# Patient Record
Sex: FEMALE | Race: WHITE | NOT HISPANIC OR LATINO | Employment: STUDENT | URBAN - METROPOLITAN AREA
[De-identification: names, ages, dates, MRNs, and addresses within clinical notes are randomized per-mention and may not be internally consistent; named-entity substitution may affect disease eponyms.]

---
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
## 2022-09-21 ENCOUNTER — OFFICE VISIT (OUTPATIENT)
Dept: FAMILY MEDICINE | Facility: CLINIC | Age: 22
End: 2022-09-21
Payer: COMMERCIAL

## 2022-09-21 VITALS
SYSTOLIC BLOOD PRESSURE: 128 MMHG | OXYGEN SATURATION: 99 % | HEIGHT: 63 IN | BODY MASS INDEX: 24.42 KG/M2 | RESPIRATION RATE: 20 BRPM | DIASTOLIC BLOOD PRESSURE: 78 MMHG | HEART RATE: 99 BPM | TEMPERATURE: 98 F | WEIGHT: 137.81 LBS

## 2022-09-21 DIAGNOSIS — Z11.4 ENCOUNTER FOR SCREENING FOR HIV: ICD-10-CM

## 2022-09-21 DIAGNOSIS — Z00.00 LABORATORY EXAM ORDERED AS PART OF ROUTINE GENERAL MEDICAL EXAMINATION: ICD-10-CM

## 2022-09-21 DIAGNOSIS — Z11.3 SCREENING EXAMINATION FOR STD (SEXUALLY TRANSMITTED DISEASE): ICD-10-CM

## 2022-09-21 DIAGNOSIS — F41.9 ANXIETY: ICD-10-CM

## 2022-09-21 DIAGNOSIS — Z00.00 ROUTINE PHYSICAL EXAMINATION: Primary | ICD-10-CM

## 2022-09-21 DIAGNOSIS — Z11.59 NEED FOR HEPATITIS C SCREENING TEST: ICD-10-CM

## 2022-09-21 PROCEDURE — 3074F PR MOST RECENT SYSTOLIC BLOOD PRESSURE < 130 MM HG: ICD-10-PCS | Mod: CPTII,S$GLB,, | Performed by: NURSE PRACTITIONER

## 2022-09-21 PROCEDURE — 3074F SYST BP LT 130 MM HG: CPT | Mod: CPTII,S$GLB,, | Performed by: NURSE PRACTITIONER

## 2022-09-21 PROCEDURE — 3008F BODY MASS INDEX DOCD: CPT | Mod: CPTII,S$GLB,, | Performed by: NURSE PRACTITIONER

## 2022-09-21 PROCEDURE — 3078F DIAST BP <80 MM HG: CPT | Mod: CPTII,S$GLB,, | Performed by: NURSE PRACTITIONER

## 2022-09-21 PROCEDURE — 99385 PREV VISIT NEW AGE 18-39: CPT | Mod: S$GLB,,, | Performed by: NURSE PRACTITIONER

## 2022-09-21 PROCEDURE — 3008F PR BODY MASS INDEX (BMI) DOCUMENTED: ICD-10-PCS | Mod: CPTII,S$GLB,, | Performed by: NURSE PRACTITIONER

## 2022-09-21 PROCEDURE — 3078F PR MOST RECENT DIASTOLIC BLOOD PRESSURE < 80 MM HG: ICD-10-PCS | Mod: CPTII,S$GLB,, | Performed by: NURSE PRACTITIONER

## 2022-09-21 PROCEDURE — 1159F MED LIST DOCD IN RCRD: CPT | Mod: CPTII,S$GLB,, | Performed by: NURSE PRACTITIONER

## 2022-09-21 PROCEDURE — 99385 PR PREVENTIVE VISIT,NEW,18-39: ICD-10-PCS | Mod: S$GLB,,, | Performed by: NURSE PRACTITIONER

## 2022-09-21 PROCEDURE — 1159F PR MEDICATION LIST DOCUMENTED IN MEDICAL RECORD: ICD-10-PCS | Mod: CPTII,S$GLB,, | Performed by: NURSE PRACTITIONER

## 2022-09-21 RX ORDER — HYDROXYZINE HYDROCHLORIDE 10 MG/1
10 TABLET, FILM COATED ORAL 3 TIMES DAILY PRN
Qty: 40 TABLET | Refills: 1 | Status: SHIPPED | OUTPATIENT
Start: 2022-09-21

## 2022-09-21 RX ORDER — ESCITALOPRAM OXALATE 10 MG/1
10 TABLET ORAL DAILY
Qty: 90 TABLET | Refills: 3 | Status: SHIPPED | OUTPATIENT
Start: 2022-09-21 | End: 2023-06-27 | Stop reason: SDUPTHER

## 2022-09-21 RX ORDER — VALACYCLOVIR HYDROCHLORIDE 1 G/1
TABLET, FILM COATED ORAL
COMMUNITY
Start: 2022-04-27 | End: 2022-09-30 | Stop reason: SDUPTHER

## 2022-09-21 NOTE — PROGRESS NOTES
Subjective:       Patient ID: Arthur Ibarra is a 21 y.o. female.    Chief Complaint: Depression and Anxiety  The patient is here for routine physical/establish care/discuss anxiety.    DepressionPatient presents with the following symptoms: decreased concentration, depressed mood, excessive worry, insomnia, irritability, muscle tension, nervousness/anxiety, obsessions, palpitations, panic and restlessness.  Patient is not experiencing: choking sensation and shortness of breath.      Anxiety  Presents for initial visit. Symptoms include decreased concentration, depressed mood, dizziness, excessive worry, insomnia, irritability, muscle tension, nervous/anxious behavior, obsessions, palpitations, panic and restlessness. Patient reports no nausea or shortness of breath.     NO SI/HI.   Review of Systems   Constitutional:  Positive for irritability. Negative for activity change, appetite change, chills and fever.   HENT:  Negative for congestion, ear pain, postnasal drip, rhinorrhea and sinus pressure.    Eyes:  Negative for pain, redness and itching.   Respiratory:  Negative for choking, chest tightness and shortness of breath.    Cardiovascular:  Positive for palpitations.   Gastrointestinal:  Negative for abdominal distention, abdominal pain, blood in stool, constipation, diarrhea, nausea and vomiting.   Endocrine: Negative for polydipsia, polyphagia and polyuria.   Genitourinary:  Negative for difficulty urinating, dysuria, flank pain and hematuria.   Musculoskeletal:  Negative for arthralgias and myalgias.   Skin:  Negative for color change, pallor and rash.   Neurological:  Positive for dizziness. Negative for light-headedness and headaches.   Hematological:  Negative for adenopathy. Does not bruise/bleed easily.   Psychiatric/Behavioral:  Positive for decreased concentration and depression. Negative for agitation and behavioral problems. The patient is nervous/anxious and has insomnia.      Past medical, surgical,  "family and social history reviewed.  Objective:     Vitals:    09/21/22 1453   BP: 128/78   Pulse: 99   Resp: 20   Temp: 98.3 °F (36.8 °C)   SpO2: 99%   Weight: 62.5 kg (137 lb 12.6 oz)   Height: 5' 3" (1.6 m)   PainSc: 0-No pain     Body mass index is 24.41 kg/m².     Physical Exam  Constitutional:       Appearance: She is well-developed.   HENT:      Head: Normocephalic and atraumatic.      Right Ear: External ear normal.      Left Ear: External ear normal.      Nose: Nose normal.   Eyes:      General: No scleral icterus.        Right eye: No discharge.         Left eye: No discharge.      Conjunctiva/sclera: Conjunctivae normal.   Neck:      Trachea: No tracheal deviation.   Cardiovascular:      Rate and Rhythm: Normal rate and regular rhythm.      Heart sounds: Normal heart sounds. No murmur heard.    No friction rub.   Pulmonary:      Effort: Pulmonary effort is normal. No respiratory distress.      Breath sounds: Normal breath sounds. No stridor. No wheezing or rales.   Chest:      Chest wall: No tenderness.   Musculoskeletal:         General: Normal range of motion.      Cervical back: Normal range of motion and neck supple.   Lymphadenopathy:      Cervical: No cervical adenopathy.   Skin:     General: Skin is warm and dry.   Neurological:      Mental Status: She is alert and oriented to person, place, and time.       Assessment:       1. Routine physical examination    2. Laboratory exam ordered as part of routine general medical examination    3. Anxiety    4. Need for hepatitis C screening test    5. Encounter for screening for HIV    6. Screening examination for STD (sexually transmitted disease)        Plan:       Arthur was seen today for depression and anxiety.    Diagnoses and all orders for this visit:    Routine physical examination    Laboratory exam ordered as part of routine general medical examination  -     CBC Auto Differential; Future  -     Comprehensive Metabolic Panel; Future  -     " Hemoglobin A1C; Future  -     Lipid Panel; Future  -     TSH; Future    Anxiety  -     Ambulatory referral/consult to Psychology; Future    Need for hepatitis C screening test  -     Hepatitis C Antibody; Future    Encounter for screening for HIV  -     HIV 1/2 Ag/Ab (4th Gen); Future    Screening examination for STD (sexually transmitted disease)  -     C. trachomatis/N. gonorrhoeae by AMP DNA [facility]; Urine    Other orders  -     EScitalopram oxalate (LEXAPRO) 10 MG tablet; Take 1 tablet (10 mg total) by mouth once daily.  -     hydrOXYzine HCL (ATARAX) 10 MG Tab; Take 1 tablet (10 mg total) by mouth 3 (three) times daily as needed (anxiety).

## 2022-09-29 ENCOUNTER — LAB VISIT (OUTPATIENT)
Dept: LAB | Facility: HOSPITAL | Age: 22
End: 2022-09-29
Payer: COMMERCIAL

## 2022-09-29 DIAGNOSIS — Z11.59 NEED FOR HEPATITIS C SCREENING TEST: ICD-10-CM

## 2022-09-29 DIAGNOSIS — Z11.4 ENCOUNTER FOR SCREENING FOR HIV: ICD-10-CM

## 2022-09-29 DIAGNOSIS — Z00.00 LABORATORY EXAM ORDERED AS PART OF ROUTINE GENERAL MEDICAL EXAMINATION: ICD-10-CM

## 2022-09-29 LAB
ALBUMIN SERPL BCP-MCNC: 4.6 G/DL (ref 3.5–5.2)
ALP SERPL-CCNC: 93 U/L (ref 55–135)
ALT SERPL W/O P-5'-P-CCNC: 13 U/L (ref 10–44)
ANION GAP SERPL CALC-SCNC: 6 MMOL/L (ref 8–16)
AST SERPL-CCNC: 20 U/L (ref 10–40)
BASOPHILS # BLD AUTO: 0.05 K/UL (ref 0–0.2)
BASOPHILS NFR BLD: 0.6 % (ref 0–1.9)
BILIRUB SERPL-MCNC: 1.5 MG/DL (ref 0.1–1)
BUN SERPL-MCNC: 10 MG/DL (ref 6–20)
CALCIUM SERPL-MCNC: 10.4 MG/DL (ref 8.7–10.5)
CHLORIDE SERPL-SCNC: 103 MMOL/L (ref 95–110)
CHOLEST SERPL-MCNC: 147 MG/DL (ref 120–199)
CHOLEST/HDLC SERPL: 2.2 {RATIO} (ref 2–5)
CO2 SERPL-SCNC: 26 MMOL/L (ref 23–29)
CREAT SERPL-MCNC: 0.8 MG/DL (ref 0.5–1.4)
DIFFERENTIAL METHOD: ABNORMAL
EOSINOPHIL # BLD AUTO: 0.1 K/UL (ref 0–0.5)
EOSINOPHIL NFR BLD: 1.2 % (ref 0–8)
ERYTHROCYTE [DISTWIDTH] IN BLOOD BY AUTOMATED COUNT: 13.4 % (ref 11.5–14.5)
EST. GFR  (NO RACE VARIABLE): >60 ML/MIN/1.73 M^2
ESTIMATED AVG GLUCOSE: 94 MG/DL (ref 68–131)
GLUCOSE SERPL-MCNC: 85 MG/DL (ref 70–110)
HBA1C MFR BLD: 4.9 % (ref 4–5.6)
HCT VFR BLD AUTO: 44.4 % (ref 37–48.5)
HCV AB SERPL QL IA: NORMAL
HDLC SERPL-MCNC: 68 MG/DL (ref 40–75)
HDLC SERPL: 46.3 % (ref 20–50)
HGB BLD-MCNC: 14 G/DL (ref 12–16)
HIV 1+2 AB+HIV1 P24 AG SERPL QL IA: NORMAL
IMM GRANULOCYTES # BLD AUTO: 0.04 K/UL (ref 0–0.04)
IMM GRANULOCYTES NFR BLD AUTO: 0.5 % (ref 0–0.5)
LDLC SERPL CALC-MCNC: 68.6 MG/DL (ref 63–159)
LYMPHOCYTES # BLD AUTO: 2.4 K/UL (ref 1–4.8)
LYMPHOCYTES NFR BLD: 28.3 % (ref 18–48)
MCH RBC QN AUTO: 27.1 PG (ref 27–31)
MCHC RBC AUTO-ENTMCNC: 31.5 G/DL (ref 32–36)
MCV RBC AUTO: 86 FL (ref 82–98)
MONOCYTES # BLD AUTO: 0.6 K/UL (ref 0.3–1)
MONOCYTES NFR BLD: 7.5 % (ref 4–15)
NEUTROPHILS # BLD AUTO: 5.3 K/UL (ref 1.8–7.7)
NEUTROPHILS NFR BLD: 61.9 % (ref 38–73)
NONHDLC SERPL-MCNC: 79 MG/DL
NRBC BLD-RTO: 0 /100 WBC
PLATELET # BLD AUTO: 314 K/UL (ref 150–450)
PMV BLD AUTO: 11.7 FL (ref 9.2–12.9)
POTASSIUM SERPL-SCNC: 4.8 MMOL/L (ref 3.5–5.1)
PROT SERPL-MCNC: 7.4 G/DL (ref 6–8.4)
RBC # BLD AUTO: 5.16 M/UL (ref 4–5.4)
SODIUM SERPL-SCNC: 135 MMOL/L (ref 136–145)
TRIGL SERPL-MCNC: 52 MG/DL (ref 30–150)
TSH SERPL DL<=0.005 MIU/L-ACNC: 0.84 UIU/ML (ref 0.4–4)
WBC # BLD AUTO: 8.53 K/UL (ref 3.9–12.7)

## 2022-09-29 PROCEDURE — 80061 LIPID PANEL: CPT | Performed by: NURSE PRACTITIONER

## 2022-09-29 PROCEDURE — 84443 ASSAY THYROID STIM HORMONE: CPT | Performed by: NURSE PRACTITIONER

## 2022-09-29 PROCEDURE — 83036 HEMOGLOBIN GLYCOSYLATED A1C: CPT | Performed by: NURSE PRACTITIONER

## 2022-09-29 PROCEDURE — 87389 HIV-1 AG W/HIV-1&-2 AB AG IA: CPT | Performed by: NURSE PRACTITIONER

## 2022-09-29 PROCEDURE — 85025 COMPLETE CBC W/AUTO DIFF WBC: CPT | Performed by: NURSE PRACTITIONER

## 2022-09-29 PROCEDURE — 86803 HEPATITIS C AB TEST: CPT | Performed by: NURSE PRACTITIONER

## 2022-09-29 PROCEDURE — 36415 COLL VENOUS BLD VENIPUNCTURE: CPT | Mod: PO | Performed by: NURSE PRACTITIONER

## 2022-09-29 PROCEDURE — 80053 COMPREHEN METABOLIC PANEL: CPT | Performed by: NURSE PRACTITIONER

## 2022-09-30 RX ORDER — VALACYCLOVIR HYDROCHLORIDE 1 G/1
1000 TABLET, FILM COATED ORAL DAILY
Qty: 90 TABLET | Refills: 0 | Status: SHIPPED | OUTPATIENT
Start: 2022-09-30

## 2022-09-30 NOTE — TELEPHONE ENCOUNTER
----- Message from Jazmyn Quezada sent at 9/30/2022  8:21 AM CDT -----  Contact: pt  Type:  RX Refill Request    Who Called:  pt  Refill or New Rx:  refill  RX Name and Strength:  valACYclovir (VALTREX) 1000 MG tablet  How is the patient currently taking it? (ex. 1XDay):  As Directed  Is this a 30 day or 90 day RX: 30  Preferred Pharmacy with phone number:  [facility] in [place]  Local or Mail Order:  local  Ordering Provider:  Chace Oquendo Call Back Number:  [phone]    Additional Information:  Please contact pt upon completion-Thank you~

## 2022-10-10 ENCOUNTER — PATIENT MESSAGE (OUTPATIENT)
Dept: PSYCHIATRY | Facility: CLINIC | Age: 22
End: 2022-10-10
Payer: COMMERCIAL

## 2022-10-10 ENCOUNTER — TELEPHONE (OUTPATIENT)
Dept: PSYCHIATRY | Facility: CLINIC | Age: 22
End: 2022-10-10
Payer: COMMERCIAL

## 2022-10-10 NOTE — TELEPHONE ENCOUNTER
Tried calling pt about referral no answer LVM (left clinic contact info) and sent [employer] message

## 2023-01-04 ENCOUNTER — PATIENT OUTREACH (OUTPATIENT)
Dept: ADMINISTRATIVE | Facility: HOSPITAL | Age: 23
End: 2023-01-04
Payer: COMMERCIAL

## 2023-01-04 NOTE — PROGRESS NOTES
2023 Care Everywhere updates requested and reviewed.  Immunizations reconciled. Media reports reviewed.  Duplicate HM overrides and  orders removed.  Overdue HM topic chart audit and/or requested.  Overdue lab testing linked to upcoming lab appointments if applies.    Health Maintenance Due   Topic Date Due    COVID-19 Vaccine (1) Never done    Chlamydia Screening  Never done

## 2023-01-18 ENCOUNTER — OFFICE VISIT (OUTPATIENT)
Dept: FAMILY MEDICINE | Facility: CLINIC | Age: 23
End: 2023-01-18
Payer: COMMERCIAL

## 2023-01-18 VITALS
OXYGEN SATURATION: 99 % | RESPIRATION RATE: 20 BRPM | WEIGHT: 141 LBS | TEMPERATURE: 99 F | BODY MASS INDEX: 24.98 KG/M2 | DIASTOLIC BLOOD PRESSURE: 78 MMHG | HEART RATE: 88 BPM | SYSTOLIC BLOOD PRESSURE: 120 MMHG | HEIGHT: 63 IN

## 2023-01-18 DIAGNOSIS — F41.9 ANXIETY: Primary | ICD-10-CM

## 2023-01-18 DIAGNOSIS — H66.90 OTITIS MEDIA, UNSPECIFIED LATERALITY, UNSPECIFIED OTITIS MEDIA TYPE: ICD-10-CM

## 2023-01-18 PROCEDURE — 3008F PR BODY MASS INDEX (BMI) DOCUMENTED: ICD-10-PCS | Mod: CPTII,S$GLB,, | Performed by: NURSE PRACTITIONER

## 2023-01-18 PROCEDURE — 1159F MED LIST DOCD IN RCRD: CPT | Mod: CPTII,S$GLB,, | Performed by: NURSE PRACTITIONER

## 2023-01-18 PROCEDURE — 99214 PR OFFICE/OUTPT VISIT, EST, LEVL IV, 30-39 MIN: ICD-10-PCS | Mod: S$GLB,,, | Performed by: NURSE PRACTITIONER

## 2023-01-18 PROCEDURE — 1160F RVW MEDS BY RX/DR IN RCRD: CPT | Mod: CPTII,S$GLB,, | Performed by: NURSE PRACTITIONER

## 2023-01-18 PROCEDURE — 99214 OFFICE O/P EST MOD 30 MIN: CPT | Mod: S$GLB,,, | Performed by: NURSE PRACTITIONER

## 2023-01-18 PROCEDURE — 3074F PR MOST RECENT SYSTOLIC BLOOD PRESSURE < 130 MM HG: ICD-10-PCS | Mod: CPTII,S$GLB,, | Performed by: NURSE PRACTITIONER

## 2023-01-18 PROCEDURE — 3078F PR MOST RECENT DIASTOLIC BLOOD PRESSURE < 80 MM HG: ICD-10-PCS | Mod: CPTII,S$GLB,, | Performed by: NURSE PRACTITIONER

## 2023-01-18 PROCEDURE — 1160F PR REVIEW ALL MEDS BY PRESCRIBER/CLIN PHARMACIST DOCUMENTED: ICD-10-PCS | Mod: CPTII,S$GLB,, | Performed by: NURSE PRACTITIONER

## 2023-01-18 PROCEDURE — 3074F SYST BP LT 130 MM HG: CPT | Mod: CPTII,S$GLB,, | Performed by: NURSE PRACTITIONER

## 2023-01-18 PROCEDURE — 1159F PR MEDICATION LIST DOCUMENTED IN MEDICAL RECORD: ICD-10-PCS | Mod: CPTII,S$GLB,, | Performed by: NURSE PRACTITIONER

## 2023-01-18 PROCEDURE — 3078F DIAST BP <80 MM HG: CPT | Mod: CPTII,S$GLB,, | Performed by: NURSE PRACTITIONER

## 2023-01-18 PROCEDURE — 3008F BODY MASS INDEX DOCD: CPT | Mod: CPTII,S$GLB,, | Performed by: NURSE PRACTITIONER

## 2023-01-18 RX ORDER — AMOXICILLIN 875 MG/1
875 TABLET, FILM COATED ORAL EVERY 12 HOURS
Qty: 14 TABLET | Refills: 0 | Status: SHIPPED | OUTPATIENT
Start: 2023-01-18 | End: 2023-01-25

## 2023-01-18 NOTE — PROGRESS NOTES
"Subjective:       Patient ID: Arthur Ibarra is a 22 y.o. female.    Chief Complaint: Follow-up  Right ear pain, pt flew recently, now cannot hear.   Fullness, popped at times now she cannot pop    Pt was put on lexapro 10 mg daily at our last visit for anxiety, feels like this is working well. She did not like the way that the hydroxyzine made her feel. She denies any SI/HI.         Follow-up  Pertinent negatives include no abdominal pain, arthralgias, congestion, headaches, myalgias, nausea, rash or vomiting.   Review of Systems   Constitutional:  Negative for activity change and appetite change.   HENT:  Negative for congestion, postnasal drip, rhinorrhea and sinus pressure.    Eyes:  Negative for pain and redness.   Respiratory:  Negative for choking and chest tightness.    Gastrointestinal:  Negative for abdominal distention, abdominal pain, blood in stool, constipation, diarrhea, nausea and vomiting.   Endocrine: Negative for polydipsia and polyphagia.   Genitourinary:  Negative for dysuria and hematuria.   Musculoskeletal:  Negative for arthralgias and myalgias.   Skin:  Negative for color change and rash.   Neurological:  Negative for dizziness and headaches.   Psychiatric/Behavioral:  Negative for agitation and behavioral problems.      Past medical, surgical, family and social history reviewed.  Objective:     Vitals:    01/18/23 0859   BP: 120/78   Pulse: 88   Resp: 20   Temp: 98.6 °F (37 °C)   SpO2: 99%   Weight: 64 kg (140 lb 15.8 oz)   Height: 5' 3" (1.6 m)   PainSc: 0-No pain     Body mass index is 24.97 kg/m².     Physical Exam  Constitutional:       Appearance: She is well-developed.   HENT:      Head: Normocephalic and atraumatic.      Right Ear: External ear normal. Tympanic membrane is erythematous and bulging.      Left Ear: External ear normal.      Nose: Nose normal.   Eyes:      General: No scleral icterus.        Right eye: No discharge.         Left eye: No discharge.      " Conjunctiva/sclera: Conjunctivae normal.   Neck:      Trachea: No tracheal deviation.   Cardiovascular:      Rate and Rhythm: Normal rate and regular rhythm.      Heart sounds: Normal heart sounds. No murmur heard.    No friction rub.   Pulmonary:      Effort: Pulmonary effort is normal. No respiratory distress.      Breath sounds: Normal breath sounds. No stridor. No wheezing or rales.   Chest:      Chest wall: No tenderness.   Musculoskeletal:         General: Normal range of motion.      Cervical back: Normal range of motion and neck supple.   Lymphadenopathy:      Cervical: No cervical adenopathy.   Skin:     General: Skin is warm and dry.   Neurological:      Mental Status: She is alert and oriented to person, place, and time.       Assessment:       1. Anxiety    2. Otitis media, unspecified laterality, unspecified otitis media type        Plan:       Arthur was seen today for follow-up.    Diagnoses and all orders for this visit:    Anxiety  Stable  Continue lexapro    Otitis media, unspecified laterality, unspecified otitis media type  -     amoxicillin (AMOXIL) 875 MG tablet; Take 1 tablet (875 mg total) by mouth every 12 (twelve) hours. for 7 days        I spent 30 minutes on this encounter, time includes face-to-face, chart review, documentation, test review and orders.

## 2023-04-20 ENCOUNTER — OFFICE VISIT (OUTPATIENT)
Dept: FAMILY MEDICINE | Facility: CLINIC | Age: 23
End: 2023-04-20
Payer: COMMERCIAL

## 2023-04-20 VITALS
BODY MASS INDEX: 24.91 KG/M2 | WEIGHT: 140.56 LBS | TEMPERATURE: 98 F | HEART RATE: 112 BPM | OXYGEN SATURATION: 99 % | HEIGHT: 63 IN | SYSTOLIC BLOOD PRESSURE: 122 MMHG | RESPIRATION RATE: 20 BRPM | DIASTOLIC BLOOD PRESSURE: 78 MMHG

## 2023-04-20 DIAGNOSIS — J98.9 REACTIVE AIRWAY DISEASE WITHOUT ASTHMA: Primary | ICD-10-CM

## 2023-04-20 DIAGNOSIS — J32.9 SINUSITIS, UNSPECIFIED CHRONICITY, UNSPECIFIED LOCATION: ICD-10-CM

## 2023-04-20 DIAGNOSIS — R50.9 FEVER, UNSPECIFIED FEVER CAUSE: ICD-10-CM

## 2023-04-20 LAB
CTP QC/QA: YES
CTP QC/QA: YES
POC MOLECULAR INFLUENZA A AGN: NEGATIVE
POC MOLECULAR INFLUENZA B AGN: NEGATIVE
SARS-COV-2 RDRP RESP QL NAA+PROBE: NEGATIVE

## 2023-04-20 PROCEDURE — 3078F DIAST BP <80 MM HG: CPT | Mod: CPTII,S$GLB,, | Performed by: NURSE PRACTITIONER

## 2023-04-20 PROCEDURE — 3008F BODY MASS INDEX DOCD: CPT | Mod: CPTII,S$GLB,, | Performed by: NURSE PRACTITIONER

## 2023-04-20 PROCEDURE — 99214 OFFICE O/P EST MOD 30 MIN: CPT | Mod: S$GLB,,, | Performed by: NURSE PRACTITIONER

## 2023-04-20 PROCEDURE — 99214 PR OFFICE/OUTPT VISIT, EST, LEVL IV, 30-39 MIN: ICD-10-PCS | Mod: S$GLB,,, | Performed by: NURSE PRACTITIONER

## 2023-04-20 PROCEDURE — 1159F PR MEDICATION LIST DOCUMENTED IN MEDICAL RECORD: ICD-10-PCS | Mod: CPTII,S$GLB,, | Performed by: NURSE PRACTITIONER

## 2023-04-20 PROCEDURE — 1159F MED LIST DOCD IN RCRD: CPT | Mod: CPTII,S$GLB,, | Performed by: NURSE PRACTITIONER

## 2023-04-20 PROCEDURE — 3074F SYST BP LT 130 MM HG: CPT | Mod: CPTII,S$GLB,, | Performed by: NURSE PRACTITIONER

## 2023-04-20 PROCEDURE — 3074F PR MOST RECENT SYSTOLIC BLOOD PRESSURE < 130 MM HG: ICD-10-PCS | Mod: CPTII,S$GLB,, | Performed by: NURSE PRACTITIONER

## 2023-04-20 PROCEDURE — 3008F PR BODY MASS INDEX (BMI) DOCUMENTED: ICD-10-PCS | Mod: CPTII,S$GLB,, | Performed by: NURSE PRACTITIONER

## 2023-04-20 PROCEDURE — 3078F PR MOST RECENT DIASTOLIC BLOOD PRESSURE < 80 MM HG: ICD-10-PCS | Mod: CPTII,S$GLB,, | Performed by: NURSE PRACTITIONER

## 2023-04-20 PROCEDURE — 87502 INFLUENZA DNA AMP PROBE: CPT | Mod: QW,,, | Performed by: NURSE PRACTITIONER

## 2023-04-20 PROCEDURE — 87635: ICD-10-PCS | Mod: QW,S$GLB,, | Performed by: NURSE PRACTITIONER

## 2023-04-20 PROCEDURE — 87635 SARS-COV-2 COVID-19 AMP PRB: CPT | Mod: QW,S$GLB,, | Performed by: NURSE PRACTITIONER

## 2023-04-20 PROCEDURE — 87502 POCT INFLUENZA A/B MOLECULAR: ICD-10-PCS | Mod: QW,,, | Performed by: NURSE PRACTITIONER

## 2023-04-20 RX ORDER — METRONIDAZOLE 500 MG/1
500 TABLET ORAL 2 TIMES DAILY
COMMUNITY
Start: 2023-04-17 | End: 2023-04-20

## 2023-04-20 RX ORDER — AMOXICILLIN AND CLAVULANATE POTASSIUM 875; 125 MG/1; MG/1
1 TABLET, FILM COATED ORAL 2 TIMES DAILY
Qty: 14 TABLET | Refills: 0 | Status: SHIPPED | OUTPATIENT
Start: 2023-04-20 | End: 2023-04-27

## 2023-04-20 RX ORDER — AZITHROMYCIN 500 MG/1
1000 TABLET, FILM COATED ORAL ONCE
COMMUNITY
Start: 2023-04-19

## 2023-04-20 RX ORDER — PREDNISONE 20 MG/1
40 TABLET ORAL DAILY
Qty: 14 TABLET | Refills: 0 | Status: SHIPPED | OUTPATIENT
Start: 2023-04-20 | End: 2023-04-27

## 2023-04-20 NOTE — PROGRESS NOTES
"Subjective:       Patient ID: Arthur Ibarra is a 22 y.o. female.    Chief Complaint: Headache, Cough, Sore Throat, and Nasal Congestion    URI   This is a new problem. Episode onset: one week. Associated symptoms include congestion, coughing, ear pain, headaches, a plugged ear sensation, rhinorrhea, sinus pain, sneezing, a sore throat, swollen glands and wheezing. Pertinent negatives include no abdominal pain, chest pain, diarrhea, dysuria, nausea or vomiting. She has tried antihistamine, decongestant, increased fluids, NSAIDs and acetaminophen (mucinex) for the symptoms.   Review of Systems   Constitutional:  Positive for activity change, appetite change, fatigue and fever.   HENT:  Positive for congestion, ear pain, postnasal drip, rhinorrhea, sinus pressure, sinus pain, sneezing and sore throat.    Eyes:  Negative for pain and redness.   Respiratory:  Positive for cough and wheezing. Negative for choking, chest tightness and shortness of breath.    Cardiovascular:  Negative for chest pain and palpitations.   Gastrointestinal:  Negative for abdominal distention, abdominal pain, constipation, diarrhea, nausea and vomiting.   Endocrine: Negative for polydipsia and polyphagia.   Genitourinary:  Negative for dysuria and hematuria.   Musculoskeletal:  Negative for arthralgias, joint swelling and myalgias.   Skin:  Negative for color change and pallor.   Neurological:  Positive for headaches. Negative for dizziness and light-headedness.     Past medical, surgical, family and social history reviewed.  Objective:     Vitals:    04/20/23 1053   BP: 122/78   Pulse: (!) 112   Resp: 20   Temp: 98.3 °F (36.8 °C)   SpO2: 99%   Weight: 63.7 kg (140 lb 8.7 oz)   Height: 5' 3" (1.6 m)   PainSc: 0-No pain     Body mass index is 24.9 kg/m².     Physical Exam  Constitutional:       Appearance: She is well-developed.   HENT:      Head: Normocephalic and atraumatic.      Right Ear: Hearing, tympanic membrane, ear canal and external ear " normal.      Left Ear: Hearing, tympanic membrane, ear canal and external ear normal.      Nose: Mucosal edema and rhinorrhea present. No laceration.      Right Sinus: Maxillary sinus tenderness and frontal sinus tenderness present.      Left Sinus: Maxillary sinus tenderness and frontal sinus tenderness present.      Mouth/Throat:      Pharynx: Uvula midline. Posterior oropharyngeal erythema present.   Eyes:      General: No scleral icterus.        Right eye: No discharge.         Left eye: No discharge.      Conjunctiva/sclera: Conjunctivae normal.   Neck:      Trachea: No tracheal deviation.   Cardiovascular:      Rate and Rhythm: Normal rate and regular rhythm.      Heart sounds: No murmur heard.  Pulmonary:      Effort: Pulmonary effort is normal. No respiratory distress.      Breath sounds: No stridor. Wheezing present. No rales.      Comments: inspiratory and expiratory wheezing to the upper lobes bilaterally  Chest:      Chest wall: No tenderness.   Musculoskeletal:         General: Normal range of motion.      Cervical back: Normal range of motion and neck supple.   Lymphadenopathy:      Cervical: Cervical adenopathy present.   Skin:     General: Skin is warm and dry.   Neurological:      Mental Status: She is alert and oriented to person, place, and time.   Psychiatric:         Behavior: Behavior normal.         Thought Content: Thought content normal.         Judgment: Judgment normal.       Assessment:       1. Reactive airway disease without asthma    2. Fever, unspecified fever cause    3. Sinusitis, unspecified chronicity, unspecified location        Plan:       Arthur was seen today for headache, cough, sore throat and nasal congestion.    Diagnoses and all orders for this visit:    Reactive airway disease without asthma  -     predniSONE (DELTASONE) 20 MG tablet; Take 2 tablets (40 mg total) by mouth once daily. for 7 days    Fever, unspecified fever cause  -     POCT COVID-19 Rapid Screening  -      POCT Influenza A/B Molecular  Results for orders placed or performed in visit on 04/20/23   POCT COVID-19 Rapid Screening   Result Value Ref Range    POC Rapid COVID Negative Negative     Acceptable Yes    POCT Influenza A/B Molecular   Result Value Ref Range    POC Molecular Influenza A Ag Negative Negative, Not Reported    POC Molecular Influenza B Ag Negative Negative, Not Reported     Acceptable Yes        Sinusitis, unspecified chronicity, unspecified location  -     amoxicillin-clavulanate 875-125mg (AUGMENTIN) 875-125 mg per tablet; Take 1 tablet by mouth 2 (two) times daily. for 7 days

## 2023-04-21 ENCOUNTER — TELEPHONE (OUTPATIENT)
Dept: FAMILY MEDICINE | Facility: CLINIC | Age: 23
End: 2023-04-21
Payer: COMMERCIAL

## 2023-04-21 NOTE — TELEPHONE ENCOUNTER
----- Message from Walker Kraus sent at 4/21/2023  7:19 AM CDT -----  Regarding: work excuse  Type:  Needs Medical Advice    Who Called: Pt  Symptoms (please be specific): Still sick     Would the patient rather a call back or a response via [facility]? Call back    Best Call Back Number: [phone]    Additional Information: Sts that she is still sick and can't make it into work and needs another note to cover for today.  Wants to know how she can get that note.  Please advise -- Thank you

## 2023-04-21 NOTE — LETTER
April 24, 2023      [id number] [place] [address] [address]  [facility] [address]  Phone: [phone]  Fax: [phone]       Patient: Arthur Ibarra   YOB: 2000      To Whom It May Concern:    Doreen Ibarra  was at [facility] on 04/20/23. Please excuse her from work/school on 04/21/23. The patient may return to work/school on 04/22/23 with no restrictions. If you have any questions or concerns, or if I can be of further assistance, please do not hesitate to contact me.    Sincerely,    Cheryl MasNP

## 2023-04-24 NOTE — TELEPHONE ENCOUNTER
----- Message from Devyn Mallory sent at 4/24/2023  8:38 AM CDT -----  Regarding: work excuse  Contact: garret at [phone]  Type: Needs Medical Advice    Who Called:  Garret    Best Call Back Number: [phone]    Additional Information: pt is f/u on msg from 4/21 abt work excuse for 4/21. Letter can be faxed directly to pts work at [phone]. Please call pt with any questions.

## 2023-06-27 RX ORDER — ESCITALOPRAM OXALATE 10 MG/1
10 TABLET ORAL DAILY
Qty: 90 TABLET | Refills: 3 | Status: SHIPPED | OUTPATIENT
Start: 2023-06-27 | End: 2024-06-26

## 2023-07-28 ENCOUNTER — TELEPHONE (OUTPATIENT)
Dept: PSYCHIATRY | Facility: CLINIC | Age: 23
End: 2023-07-28
Payer: COMMERCIAL

## 2023-07-28 NOTE — TELEPHONE ENCOUNTER
----- Message from Iza Rodriguez sent at 7/28/2023  9:10 AM CDT -----  Regarding: Needs Medical Advice  Contact: patient at [phone]  Type: Needs Medical Advice  Who Called:  patient at [phone]    Additional Information: Patient has been trying to see a therapist. Referral in the system is old and may need to be renewed. Patient was given doctor's name to try and call. Please call and advise. Thank you

## 2023-07-28 NOTE — TELEPHONE ENCOUNTER
Called patient and she is going to call [place] [facility] and [facility] to see if can be seen sooner there I explained that we do not have any therapist taking NP at this time and can be several months so I gave her the numbers to the other [facility] locations and she is going to try them.

## 2024-07-09 ENCOUNTER — PATIENT MESSAGE (OUTPATIENT)
Dept: ADMINISTRATIVE | Facility: HOSPITAL | Age: 24
End: 2024-07-09
Payer: COMMERCIAL

## 2024-12-13 ENCOUNTER — TELEPHONE (OUTPATIENT)
Dept: FAMILY MEDICINE | Facility: CLINIC | Age: 24
End: 2024-12-13
Payer: COMMERCIAL

## 2024-12-13 ENCOUNTER — OFFICE VISIT (OUTPATIENT)
Dept: FAMILY MEDICINE | Facility: CLINIC | Age: 24
End: 2024-12-13
Payer: COMMERCIAL

## 2024-12-13 VITALS
TEMPERATURE: 98 F | HEIGHT: 62 IN | OXYGEN SATURATION: 97 % | HEART RATE: 85 BPM | BODY MASS INDEX: 26.33 KG/M2 | SYSTOLIC BLOOD PRESSURE: 118 MMHG | WEIGHT: 143.06 LBS | DIASTOLIC BLOOD PRESSURE: 82 MMHG

## 2024-12-13 DIAGNOSIS — B34.9 ACUTE VIRAL SYNDROME: Primary | ICD-10-CM

## 2024-12-13 PROCEDURE — 99999 PR PBB SHADOW E&M-EST. PATIENT-LVL III: CPT | Mod: PBBFAC,,, | Performed by: FAMILY MEDICINE

## 2024-12-13 RX ORDER — AMOXICILLIN 500 MG/1
500 CAPSULE ORAL 2 TIMES DAILY
COMMUNITY
Start: 2024-12-10

## 2024-12-13 RX ORDER — DULOXETIN HYDROCHLORIDE 60 MG/1
60 CAPSULE, DELAYED RELEASE ORAL
COMMUNITY
Start: 2024-11-06

## 2024-12-13 RX ORDER — CODEINE PHOSPHATE AND GUAIFENESIN 10; 100 MG/5ML; MG/5ML
10 SOLUTION ORAL EVERY 4 HOURS PRN
Qty: 240 ML | Refills: 1 | Status: SHIPPED | OUTPATIENT
Start: 2024-12-13 | End: 2025-01-14

## 2024-12-13 RX ORDER — ONDANSETRON 4 MG/1
4 TABLET, FILM COATED ORAL EVERY 8 HOURS PRN
Qty: 30 TABLET | Refills: 2 | Status: SHIPPED | OUTPATIENT
Start: 2024-12-13

## 2024-12-13 NOTE — TELEPHONE ENCOUNTER
----- Message from Nati sent at 12/13/2024  8:40 AM CST -----  Regarding: return call  Contact: patient  Type:  Patient Returning Call    Who Called:  patient  Who Left Message for Patient:  Mratha  Does the patient know what this is regarding?:  appointment  Best Call Back Number:  [phone] or send message in portal    Additional Information:  Please call patient to advise.  Thanks!

## 2024-12-13 NOTE — PROGRESS NOTES
Patient ID: Arthur Ibarra is a 23 y.o. female.    Chief Complaint: Emesis (Symptoms ongoing since sunday), Fever, Cough, Generalized Body Aches, and mucus    History of Present Illness    CHIEF COMPLAINT:  Patient presents today with a severe cough and nausea.    HISTORY OF PRESENT ILLNESS:  She presents with acute viral syndrome manifesting primarily as respiratory and sinus symptoms. She reports severe cough with significant mucus production leading to nausea and vomiting. Influenza testing at urgent care was negative.    MEDICATIONS:  She was given amoxicillin at urgent care.      ROS:  General: -fever, -chills, -fatigue, -weight gain, -weight loss  Eyes: -vision changes, -redness, -discharge  ENT: -ear pain, -nasal congestion, -sore throat  Cardiovascular: -chest pain, -palpitations, -lower extremity edema  Respiratory: +cough, -shortness of breath  Gastrointestinal: -abdominal pain, +nausea, +vomiting, -diarrhea, -constipation, -blood in stool  Genitourinary: -dysuria, -hematuria, -frequency  Musculoskeletal: -joint pain, -muscle pain  Skin: -rash, -lesion  Neurological: -headache, -dizziness, -numbness, -tingling  Psychiatric: -anxiety, -depression, -sleep difficulty         Physical Exam    General: No acute distress. Well-developed. Well-nourished.  Eyes: EOMI. Sclerae anicteric.  HENT: Normocephalic. Atraumatic. Nares patent. Moist oral mucosa. No pharyngeal erythema. No exudate. No signs of strep.  Ears: Bilateral TMs clear. Bilateral EACs clear.  Cardiovascular: Regular rate. Regular rhythm. No murmurs. No rubs. No gallops. Normal S1, S2.  Respiratory: Normal respiratory effort. Clear to auscultation bilaterally. No rales. No rhonchi. No wheezing.  Abdomen: Soft. Non-tender. Non-distended. Normoactive bowel sounds.  Musculoskeletal: No  obvious deformity.  Extremities: No lower extremity edema.  Neurological: Alert & oriented x3. No slurred speech. Normal gait.  Psychiatric: Normal mood. Normal affect.  Good insight. Good judgment.  Skin: Warm. Dry. No rash.  Neck: No PHIL adenopathy.         Assessment & Plan    IMPRESSION:  - Diagnosed acute viral syndrome, primarily respiratory with severe cough and nausea from swallowing mucus  - Ruled out influenza based on negative test at urgent care  - Examined for strep throat; no signs present  - Will treat symptoms with codeine-containing cough syrup and Zofran for nausea    ACUTE VIRAL SYNDROME:  - Assessed the patient's condition as an acute viral syndrome, primarily affecting the respiratory system and sinuses.  - Noted that the patient has severe cough and sinus problems.  - Performed influenza test, which was negative.  - Examined the patient and found vital signs stable and afebrile.  - No signs of strep, PHIL adenopathy, beefy red pharyngeal changes, or exudate were observed.  - Lungs were clear.  - Anticipate recovery in a few days.    COUGH:  - Prescribed codeine-containing cough syrup for cough management.    NAUSEA AND VOMITING:  - Noted that the patient is experiencing nausea and vomiting due to swallowing mucus.  - Prescribed Zofran for nausea management.    MEDICATIONS/SUPPLEMENTS:  - Advised continuation of amoxicillin.  - Noted that the patient was previously prescribed amoxicillin at urgent care.  - Advised the patient to continue the amoxicillin regimen as prescribed.              No follow-ups on file.    This note was generated with the assistance of ambient listening technology. Verbal consent was obtained by the patient and accompanying visitor(s) for the recording of patient appointment to facilitate this note. I attest to having reviewed and edited the generated note for accuracy, though some syntax or spelling errors may persist. Please contact the author of this note for any clarification.

## 2024-12-13 NOTE — TELEPHONE ENCOUNTER
----- Message from Renuka sent at 12/13/2024  8:24 AM CST -----  Contact: Patient  Type:  Same Day Appointment Request    Caller is requesting a same day appointment.  Caller declined first available appointment listed below.      Name of Caller:  Patient  When is the first available appointment?  N/A    Symptoms:  vomiting / fever / coughing / body aches / stomach issues / coughing up mucus    Best Call Back Number:  [phone]    Additional Information:   States she feels miserable and would like to be seen today to be tested for a few things - please call - thank you

## 2024-12-13 NOTE — LETTER
December 13, 2024      [facility]  [address] [place] [address]  Phone: [phone]  Fax: [phone]       Patient: Arthur Ibarra   YOB: 2000  Date of Visit: 12/13/2024    To Whom It May Concern:    Doreen Ibarra  was at [facility] on 12/13/2024. Please excuse the patient for 12/12/24 to 12/15/24. The patient may return to work on 12/15/24 with no restrictions. If you have any questions or concerns, or if I can be of further assistance, please do not hesitate to contact me.    Sincerely,    JARED Damon